# Patient Record
Sex: MALE | Race: WHITE | Employment: OTHER | URBAN - NONMETROPOLITAN AREA
[De-identification: names, ages, dates, MRNs, and addresses within clinical notes are randomized per-mention and may not be internally consistent; named-entity substitution may affect disease eponyms.]

---

## 2021-01-19 ENCOUNTER — TELEPHONE (OUTPATIENT)
Dept: CARDIOLOGY CLINIC | Age: 61
End: 2021-01-19

## 2021-03-02 ENCOUNTER — OFFICE VISIT (OUTPATIENT)
Dept: CARDIOLOGY CLINIC | Age: 61
End: 2021-03-02
Payer: MEDICARE

## 2021-03-02 VITALS
BODY MASS INDEX: 27.85 KG/M2 | HEART RATE: 72 BPM | DIASTOLIC BLOOD PRESSURE: 70 MMHG | HEIGHT: 75 IN | SYSTOLIC BLOOD PRESSURE: 138 MMHG | WEIGHT: 224 LBS

## 2021-03-02 DIAGNOSIS — R42 DIZZINESS: Primary | ICD-10-CM

## 2021-03-02 DIAGNOSIS — I49.1 PAC (PREMATURE ATRIAL CONTRACTION): ICD-10-CM

## 2021-03-02 DIAGNOSIS — E78.01 FAMILIAL HYPERCHOLESTEROLEMIA: ICD-10-CM

## 2021-03-02 DIAGNOSIS — I10 ESSENTIAL HYPERTENSION: ICD-10-CM

## 2021-03-02 DIAGNOSIS — R06.02 SOB (SHORTNESS OF BREATH): ICD-10-CM

## 2021-03-02 PROCEDURE — 93000 ELECTROCARDIOGRAM COMPLETE: CPT | Performed by: NUCLEAR MEDICINE

## 2021-03-02 PROCEDURE — 99204 OFFICE O/P NEW MOD 45 MIN: CPT | Performed by: NUCLEAR MEDICINE

## 2021-03-02 RX ORDER — OXCARBAZEPINE 150 MG/1
150 TABLET, FILM COATED ORAL 2 TIMES DAILY
COMMUNITY

## 2021-03-02 RX ORDER — GLIPIZIDE 5 MG/1
5 TABLET ORAL
COMMUNITY
End: 2022-04-18 | Stop reason: ALTCHOICE

## 2021-03-02 RX ORDER — PRAVASTATIN SODIUM 40 MG
40 TABLET ORAL EVERY OTHER DAY
COMMUNITY

## 2021-03-02 RX ORDER — LOSARTAN POTASSIUM 100 MG/1
100 TABLET ORAL DAILY
COMMUNITY

## 2021-03-02 RX ORDER — ASPIRIN 81 MG/1
81 TABLET ORAL DAILY
COMMUNITY

## 2021-03-02 RX ORDER — LEVETIRACETAM 750 MG/1
750 TABLET ORAL DAILY
COMMUNITY

## 2021-03-02 ASSESSMENT — ENCOUNTER SYMPTOMS
ABDOMINAL DISTENTION: 0
DIARRHEA: 0
SHORTNESS OF BREATH: 1
ABDOMINAL PAIN: 0
VOMITING: 0
PHOTOPHOBIA: 0
ANAL BLEEDING: 0
CONSTIPATION: 0
CHEST TIGHTNESS: 0
COLOR CHANGE: 0
BACK PAIN: 0
BLOOD IN STOOL: 0
NAUSEA: 0
RECTAL PAIN: 0

## 2021-03-02 NOTE — PROGRESS NOTES
[address] [address] [address] [facility],4Th Floor [address] [facility]  Dept: [phone]  Dept Fax: [phone]  Loc: [phone]    Visit Date: 3/2/2021    Guillermina Soulier is a 59 y.o. male who presents todayfor:  Chief Complaint   Patient presents with    New Patient    Irregular Heart Beat    Hypertension    Diabetes    Hyperlipidemia   here for the first time  He is not sure why he is here !!! Looks like he had a holter  Some PACs and short SVT  He doesn't feel it much   Does have Dm for several years  No chest pain   No changes in breathing  Does have baseline dyspnea   Does have seizure disorder  Does have HTn   Seems under control  On statins for hyperlipidemia  Does smoke  Does drink a lot of caffeine   Pot a day   Does have some dyspnea on exertion   Family history of CAD       HPI:  HPI  Past Medical History:   Diagnosis Date    Diabetes mellitus ([facility] [address]     Seizures ([facility] [address]       No past surgical history on file. No family history on file.   Social History     Tobacco Use    Smoking status: Current Every Day Smoker     Packs/day: 1.00     Types: Cigarettes   Substance Use Topics    Alcohol use: No      Current Outpatient Medications   Medication Sig Dispense Refill    levETIRAcetam (KEPPRA) 750 MG tablet Take 750 mg by mouth 2 times daily 2 in am 2 in pm      OXcarbazepine (TRILEPTAL) 150 MG tablet Take 150 mg by mouth 3 times daily      glipiZIDE (GLUCOTROL) 5 MG tablet Take 5 mg by mouth 2 times daily (before meals)      losartan (COZAAR) 100 MG tablet Take 100 mg by mouth daily      pravastatin (PRAVACHOL) 40 MG tablet Take 40 mg by mouth every other day      aspirin 81 MG EC tablet Take 81 mg by mouth daily      divalproex (DEPAKOTE ER) 500 MG extended release tablet Take 1,000 mg by mouth every 12 hours      lamoTRIgine (LAMICTAL) 150 MG tablet Take 300 mg by mouth 2 times daily  metFORMIN (GLUCOPHAGE) 1000 MG tablet Take 1,000 mg by mouth 2 times daily (with meals)       No current facility-administered medications for this visit. No Known Allergies  Health Maintenance   Topic Date Due    Hepatitis C screen  Never done    Pneumococcal 0-64 years Vaccine (1 of 1 - PPSV23) Never done    Lipid screen  Never done    HIV screen  Never done    DTaP/Tdap/Td vaccine (1 - Tdap) Never done    Diabetes screen  Never done    Shingles Vaccine (1 of 2) Never done    Colon cancer screen colonoscopy  Never done    Potassium monitoring  11/23/2017    Creatinine monitoring  11/23/2017    Flu vaccine (1) Never done    Hepatitis A vaccine  Aged Out    Hepatitis B vaccine  Aged Out    Hib vaccine  Aged Out    Meningococcal (ACWY) vaccine  Aged Out       Subjective:  Review of Systems   Constitutional: Positive for fatigue. HENT: Negative for ear pain and nosebleeds. Eyes: Negative for photophobia. Respiratory: Positive for shortness of breath. Negative for chest tightness. Cardiovascular: Negative for chest pain and palpitations. Gastrointestinal: Negative for abdominal distention, abdominal pain, anal bleeding, blood in stool, constipation, diarrhea, nausea, rectal pain and vomiting. Endocrine: Negative for polyphagia. Genitourinary: Negative for dysuria, hematuria and urgency. Musculoskeletal: Negative for arthralgias, back pain, gait problem, joint swelling, myalgias, neck pain and neck stiffness. Skin: Negative for color change, pallor, rash and wound. Allergic/Immunologic: Negative for food allergies. Neurological: Positive for seizures. Negative for dizziness and light-headedness. Psychiatric/Behavioral: Negative for confusion, decreased concentration, dysphoric mood, hallucinations and self-injury. The patient is not nervous/anxious and is not hyperactive. Objective:  Physical Exam  HENT:      Head: Normocephalic. Right Ear: Tympanic membrane normal.      Nose: Nose normal.      Mouth/Throat:      Mouth: Mucous membranes are moist.   Eyes:      Pupils: Pupils are equal, round, and reactive to light. Neck:      Musculoskeletal: Normal range of motion. Cardiovascular:      Rate and Rhythm: Normal rate and regular rhythm. Pulses: Normal pulses. Heart sounds: Murmur present. No gallop. Pulmonary:      Effort: No respiratory distress. Breath sounds: No stridor. No wheezing, rhonchi or rales. Chest:      Chest wall: No tenderness. Abdominal:      General: There is no distension. Palpations: There is no mass. Tenderness: There is no abdominal tenderness. There is no right CVA tenderness, left CVA tenderness, guarding or rebound. Hernia: No hernia is present. Musculoskeletal:         General: No swelling, tenderness, deformity or signs of injury. Right lower leg: No edema. Left lower leg: No edema. Skin:     Coloration: Skin is not jaundiced or pale. Findings: No bruising, erythema, lesion or rash. Neurological:      Mental Status: He is alert and oriented to person, place, and time. Cranial Nerves: No cranial nerve deficit. Sensory: No sensory deficit. Motor: No weakness. Coordination: Coordination normal.      Gait: Gait normal.      Deep Tendon Reflexes: Reflexes normal.   Psychiatric:         Mood and Affect: Mood normal.         Thought Content: Thought content normal.       /70   Pulse 72   Ht 6' 3\" (1.905 m)   Wt 224 lb (101.6 kg)   BMI 28.00 kg/m²     Assessment:      Diagnosis Orders   1. Dizziness  EKG 12 lead   2. PAC (premature atrial contraction)     3. Essential hypertension     4. Familial hypercholesterolemia     does have risk for CAD  Smoking: discussed with the patient the importance of smoke cessation especially with the risk of CAD.   Abnormal ECG   Some SVT and PACs and PVCs  Risk for CAD     Plan: No follow-ups on file. Discussed  Needs to cut back on caffeine   Consider an echo and stress test   Consider a low dose beta blockers   Continue risk factor modification and medical management    Thank you for allowing me to participate in the care of your patient. Please don't hesitate to contact me regarding any further issues related to the patient care    Orders Placed:  Orders Placed This Encounter   Procedures    EKG 12 lead     Order Specific Question:   Reason for Exam?     Answer: Other       Medications Prescribed:  No orders of the defined types were placed in this encounter. Discussed use, benefit, and side effects of prescribed medications. All patient questions answered. Pt voicedunderstanding. Instructed to continue current medications, diet and exercise. Continue risk factor modification and medical management. Patient agreed with treatment plan. Follow up as directed.     Electronically signedby Cyrus Parks MD on 3/2/2021 at 12:58 PM

## 2021-03-24 DIAGNOSIS — I49.1 PAC (PREMATURE ATRIAL CONTRACTION): ICD-10-CM

## 2021-03-24 DIAGNOSIS — E78.01 FAMILIAL HYPERCHOLESTEROLEMIA: ICD-10-CM

## 2021-03-24 DIAGNOSIS — R06.02 SOB (SHORTNESS OF BREATH): ICD-10-CM

## 2021-03-24 DIAGNOSIS — R42 DIZZINESS: ICD-10-CM

## 2021-03-24 DIAGNOSIS — I10 ESSENTIAL HYPERTENSION: ICD-10-CM

## 2021-05-27 ENCOUNTER — TELEPHONE (OUTPATIENT)
Dept: CARDIOLOGY CLINIC | Age: 61
End: 2021-05-27

## 2021-05-27 NOTE — TELEPHONE ENCOUNTER
[facility] at [facility] kirstin Hendrickson states pt is having dizziness spells prior to him having seizures and she did order a holter monitor on him.  Wanted you to be aware

## 2022-04-18 ENCOUNTER — OFFICE VISIT (OUTPATIENT)
Dept: CARDIOLOGY CLINIC | Age: 62
End: 2022-04-18
Payer: MEDICARE

## 2022-04-18 VITALS
WEIGHT: 217 LBS | HEART RATE: 69 BPM | BODY MASS INDEX: 26.98 KG/M2 | HEIGHT: 75 IN | DIASTOLIC BLOOD PRESSURE: 92 MMHG | SYSTOLIC BLOOD PRESSURE: 172 MMHG

## 2022-04-18 DIAGNOSIS — E78.01 FAMILIAL HYPERCHOLESTEROLEMIA: ICD-10-CM

## 2022-04-18 DIAGNOSIS — I10 ESSENTIAL HYPERTENSION: ICD-10-CM

## 2022-04-18 DIAGNOSIS — R42 DIZZINESS: Primary | ICD-10-CM

## 2022-04-18 PROCEDURE — G8419 CALC BMI OUT NRM PARAM NOF/U: HCPCS | Performed by: NUCLEAR MEDICINE

## 2022-04-18 PROCEDURE — 99214 OFFICE O/P EST MOD 30 MIN: CPT | Performed by: NUCLEAR MEDICINE

## 2022-04-18 PROCEDURE — 93000 ELECTROCARDIOGRAM COMPLETE: CPT | Performed by: NUCLEAR MEDICINE

## 2022-04-18 PROCEDURE — 3017F COLORECTAL CA SCREEN DOC REV: CPT | Performed by: NUCLEAR MEDICINE

## 2022-04-18 PROCEDURE — G8427 DOCREV CUR MEDS BY ELIG CLIN: HCPCS | Performed by: NUCLEAR MEDICINE

## 2022-04-18 PROCEDURE — 4004F PT TOBACCO SCREEN RCVD TLK: CPT | Performed by: NUCLEAR MEDICINE

## 2022-04-18 RX ORDER — BRIVARACETAM 100 MG/1
TABLET, FILM COATED ORAL
COMMUNITY

## 2022-04-18 NOTE — PROGRESS NOTES
[address]  [address]  [facility] [address]  Dept: [phone]  Dept Fax: [phone]  Loc: [phone]    Visit Date: 4/18/2022    Jose Chowdhury is a 57 y.o. male who presents todayfor:  Chief Complaint   Patient presents with    Follow-up     dizziness ref by DR Francine Sorto Loss of Consciousness    Irregular Heart Beat   had syncope  Doesn't recall the details  Was out shortly   No chest pain   No changes in breathing  Know PAcs  And PVcs  Had monitor before  Still smoking   Does have seizure disorder   Does have HTN   Seems higher today   On statins for hyperlipidemia  Referred back by neurology         HPI:  HPI  Past Medical History:   Diagnosis Date    Diabetes mellitus ([facility] [address]     Seizures ([facility] [address]       No past surgical history on file. No family history on file. Social History     Tobacco Use    Smoking status: Current Every Day Smoker     Packs/day: 1.00     Types: Cigarettes    Smokeless tobacco: Not on file   Substance Use Topics    Alcohol use: No      Current Outpatient Medications   Medication Sig Dispense Refill    Brivaracetam (BRIVIACT) 100 MG TABS Take by mouth.  levETIRAcetam (KEPPRA) 750 MG tablet Take 750 mg by mouth daily 2 in am 2 in pm       OXcarbazepine (TRILEPTAL) 150 MG tablet Take 150 mg by mouth 2 times daily       losartan (COZAAR) 100 MG tablet Take 100 mg by mouth daily      pravastatin (PRAVACHOL) 40 MG tablet Take 40 mg by mouth every other day      aspirin 81 MG EC tablet Take 81 mg by mouth daily      divalproex (DEPAKOTE ER) 500 MG extended release tablet Take 1,000 mg by mouth every 12 hours      lamoTRIgine (LAMICTAL) 150 MG tablet Take 300 mg by mouth 2 times daily      metFORMIN (GLUCOPHAGE) 1000 MG tablet Take 1,000 mg by mouth 2 times daily (with meals)       No current facility-administered medications for this visit.      No Known Allergies  Health Maintenance   Topic Date Due    Hepatitis C screen  Never done    COVID-19 Vaccine (1) Never done    Pneumococcal 0-64 years Vaccine (1 - PCV) Never done    Lipid screen  Never done    Depression Screen  Never done    HIV screen  Never done    DTaP/Tdap/Td vaccine (1 - Tdap) Never done    Diabetes screen  Never done    Colorectal Cancer Screen  Never done    Shingles Vaccine (1 of 2) Never done    Potassium monitoring  11/23/2017    Creatinine monitoring  11/23/2017    Annual Wellness Visit (AWV)  Never done    Flu vaccine (Season Ended) 09/01/2022    Hepatitis A vaccine  Aged Out    Hepatitis B vaccine  Aged Out    Hib vaccine  Aged Out    Meningococcal (ACWY) vaccine  Aged Out       Subjective:  Review of Systems  General:   No fever, no chills, some fatigue or weight loss  Pulmonary:    some dyspnea, no wheezing  Cardiac:    Denies recent chest pain,   GI:     No nausea or vomiting, no abdominal pain  Neuro:    some dizziness or light headedness,   Musculoskeletal:  No recent active issues  Extremities:   No edema, no obvious claudication       Objective:  Physical Exam  BP (!) 172/92   Pulse 69   Ht 6' 3\" (1.905 m)   Wt 217 lb (98.4 kg)   BMI 27.12 kg/m²   General:   Well developed, well nourished  Lungs:   Clear to auscultation  Heart:    Normal S1 S2, Slight murmur. no rubs, no gallops  Abdomen:   Soft, non tender, no organomegalies, positive bowel sounds  Extremities:   No edema, no cyanosis, good peripheral pulses  Neurological:   Awake, alert, oriented. No obvious focal deficits  Musculoskelatal:  No obvious deformities    Assessment:      Diagnosis Orders   1. Dizziness  EKG 12 Lead   2. Essential hypertension  EKG 12 Lead   3. Familial hypercholesterolemia     as above  Syncope  Neurological vs cardiac  Referred to me back by neurology       Plan:  No follow-ups on file.   Discussed  Event a and tilt table test   Continue risk factor modification and medical management  Thank you for allowing me to participate in the care of your patient. Please don't hesitate to contact me regarding any further issues related to the patient care    Orders Placed:  Orders Placed This Encounter   Procedures    EKG 12 Lead     Order Specific Question:   Reason for Exam?     Answer: Other       Medications Prescribed:  No orders of the defined types were placed in this encounter. Discussed use, benefit, and side effects of prescribed medications. All patient questions answered. Pt voicedunderstanding. Instructed to continue current medications, diet and exercise. Continue risk factor modification and medical management. Patient agreed with treatment plan. Follow up as directed.     Electronically signedby Arminda Astudillo MD on 4/18/2022 at 12:30 PM

## 2022-05-16 ENCOUNTER — HOSPITAL ENCOUNTER (OUTPATIENT)
Dept: NON INVASIVE DIAGNOSTICS | Age: 62
Discharge: HOME | End: 2022-05-16
Payer: MEDICARE

## 2022-05-16 DIAGNOSIS — R42 DIZZINESS: ICD-10-CM

## 2022-05-16 DIAGNOSIS — E78.01 FAMILIAL HYPERCHOLESTEROLEMIA: ICD-10-CM

## 2022-05-16 DIAGNOSIS — I10 ESSENTIAL HYPERTENSION: ICD-10-CM

## 2022-05-16 PROCEDURE — 93270 REMOTE 30 DAY ECG REV/REPORT: CPT

## 2022-05-16 PROCEDURE — 93660 TILT TABLE EVALUATION: CPT | Performed by: NUCLEAR MEDICINE

## 2022-05-16 NOTE — PROCEDURES
30 Day Cardiac Event Monitor was applied to patient. Instructions were given and skin/monitor prep and application was demonstrated. Patient was instructed to remove monitor on 06/15/2022 and mail back to [employer].

## 2022-05-17 NOTE — PROCEDURES
[address] [place] [address]                                TILT TABLE TEST    PATIENT NAME: Hi POP                  :        1960  MED REC NO:   [id number]                           ROOM:  ACCOUNT NO:   [de-identified]                           ADMIT DATE: 2022  PROVID[facility]     [facility]  [facility]    CLINICAL HISTORY AND INDICATION:  This is a patient with dizziness. TILT TABLE TEST DESCRIPTION AND FINDINGS:  Baseline EKG showed sinus  rhythm. Baseline blood pressure was 180/80, baseline heart rate was 62  beats per minute. The patient was tilted for a total of 30 minutes at  70-degree angle. Tilt was associated with an initial drop with  orthostatic hypotension dropping from 688 to 255 systolic; however, the  rest of the test stayed steady and under 983 to 279 systolic with a  steady heart rate, completed successfully without any complication. CONCLUSION:  Tilt table test associated with initial orthostatic  hypotension; otherwise, the rest of the test was unremarkable. RECOMMENDATIONS:  At this point, results were discussed with the patient  and further management will follow accordingly.         Madelyn Brand M.D.    D: 2022 14:48:12       T: 2022 15:11:40     [email or web address]  Job#: [id number]     Doc#: [id number]    CC:

## 2022-06-18 NOTE — PROCEDURES
[address] [place] [address]                                 EVENT MONITOR    PATIENT NAME: Alaina POP                  :        1960  MED REC NO:   [id number]                           ROOM:  ACCOUNT NO:   [de-identified]                           ADMIT DATE: 2022  PROVIDER:     Ashley Haider M.D.      TEST TYPE:  EVENT MONITOR. CLINICAL HISTORY AND INDICATION:  This is a patient with dizziness,  lightheadedness. EVENT MONITOR DESCRIPTION:  Event monitor was attached to the patient  between 2022 and 2022. EVENT MONITOR FINDINGS:  Baseline rhythm showed sinus rhythm. Unremarkable event monitor for any significant arrhythmias. CONCLUSIONS:  Unremarkable event monitor with no significant  arrhythmias.         Sean Sands M.D.    D: 2022 11:02:49       T: 2022 11:37:20     [email or web address]  Job#: [id number]     Doc#: [id number]    CC:

## 2023-01-23 ENCOUNTER — TELEPHONE (OUTPATIENT)
Dept: CARDIOLOGY CLINIC | Age: 63
End: 2023-01-23

## 2023-01-23 NOTE — TELEPHONE ENCOUNTER
Received referral for dizziness from the Neurology center. Called patient and scheduled him in may but talked to [place] and she said to bring him in sooner. I called patient back and asked him if he could come February 7th at 9 am in [place] but patient has to find a ride. He then told me he does not know why he is being referred to us again because Neurology center called him this morning and told him his heart monitor came back and everything was okay. I called neurology center but did not get a hold of anyone so I had to leave a message and ask them if the referral was supposed to come to us or was it a mistake. When they call back or we get any info please call patient and update him.

## 2023-01-25 NOTE — TELEPHONE ENCOUNTER
Spoke to patient. Appointment scheduled for 2-7-23 at 10:45 am [facility] office. Patient missed nov appointment with Dr. Saravia Friday.

## 2023-02-07 ENCOUNTER — OFFICE VISIT (OUTPATIENT)
Dept: CARDIOLOGY CLINIC | Age: 63
End: 2023-02-07
Payer: MEDICARE

## 2023-02-07 VITALS
DIASTOLIC BLOOD PRESSURE: 73 MMHG | HEART RATE: 58 BPM | WEIGHT: 209 LBS | SYSTOLIC BLOOD PRESSURE: 162 MMHG | HEIGHT: 75 IN | BODY MASS INDEX: 25.99 KG/M2

## 2023-02-07 DIAGNOSIS — E78.01 FAMILIAL HYPERCHOLESTEROLEMIA: ICD-10-CM

## 2023-02-07 DIAGNOSIS — I10 PRIMARY HYPERTENSION: Primary | ICD-10-CM

## 2023-02-07 PROCEDURE — 3077F SYST BP >= 140 MM HG: CPT | Performed by: NUCLEAR MEDICINE

## 2023-02-07 PROCEDURE — G8419 CALC BMI OUT NRM PARAM NOF/U: HCPCS | Performed by: NUCLEAR MEDICINE

## 2023-02-07 PROCEDURE — G8427 DOCREV CUR MEDS BY ELIG CLIN: HCPCS | Performed by: NUCLEAR MEDICINE

## 2023-02-07 PROCEDURE — 1123F ACP DISCUSS/DSCN MKR DOCD: CPT | Performed by: NUCLEAR MEDICINE

## 2023-02-07 PROCEDURE — G8484 FLU IMMUNIZE NO ADMIN: HCPCS | Performed by: NUCLEAR MEDICINE

## 2023-02-07 PROCEDURE — 3078F DIAST BP <80 MM HG: CPT | Performed by: NUCLEAR MEDICINE

## 2023-02-07 PROCEDURE — 99213 OFFICE O/P EST LOW 20 MIN: CPT | Performed by: NUCLEAR MEDICINE

## 2023-02-07 PROCEDURE — 3017F COLORECTAL CA SCREEN DOC REV: CPT | Performed by: NUCLEAR MEDICINE

## 2023-02-07 PROCEDURE — 4004F PT TOBACCO SCREEN RCVD TLK: CPT | Performed by: NUCLEAR MEDICINE

## 2023-02-07 RX ORDER — AMLODIPINE BESYLATE 5 MG/1
TABLET ORAL
COMMUNITY
Start: 2022-12-16

## 2023-02-07 NOTE — PROGRESS NOTES
[address] [address] [address] [facility],4[address] [address] [facility]  Dept: [phone]  Dept Fax: [phone]  Loc: [phone]    Visit Date: 2/7/2023    Claudean Jointer is a 70 y.o. male who presents todayfor:  Chief Complaint   Patient presents with    Follow-up     Dizziness     Hypertension    Hyperlipidemia   Seen for syncope  Cardiac work up was okay   Known seizure disorder since age 3  No chest pain  Some baseline dyspnea  Still smoking   BP is stable   No dizziness  No syncope        HPI:  HPI  Past Medical History:   Diagnosis Date    Diabetes mellitus ([address]     Seizures ([facility] [address]       No past surgical history on file. No family history on file. Social History     Tobacco Use    Smoking status: Every Day     Packs/day: 1.00     Types: Cigarettes    Smokeless tobacco: Not on file   Substance Use Topics    Alcohol use: No      Current Outpatient Medications   Medication Sig Dispense Refill    perampanel (FYCOMPA) 4 MG TABS tablet Take 4 mg by mouth nightly. Bid at night      amLODIPine (NORVASC) 5 MG tablet       Brivaracetam (BRIVIACT) 100 MG TABS Take 100 mg by mouth 2 times daily. levETIRAcetam (KEPPRA) 750 MG tablet Take 750 mg by mouth daily 2 in am 2 in pm       OXcarbazepine (TRILEPTAL) 150 MG tablet Take 150 mg by mouth 2 times daily       losartan (COZAAR) 100 MG tablet Take 100 mg by mouth daily      pravastatin (PRAVACHOL) 40 MG tablet Take 40 mg by mouth every other day      aspirin 81 MG EC tablet Take 81 mg by mouth daily      divalproex (DEPAKOTE ER) 500 MG extended release tablet Take 1,000 mg by mouth every 12 hours      lamoTRIgine (LAMICTAL) 150 MG tablet Take 300 mg by mouth 2 times daily      metFORMIN (GLUCOPHAGE) 1000 MG tablet Take 1,000 mg by mouth 2 times daily (with meals)       No current facility-administered medications for this visit.      No Known Allergies  Health Maintenance   Topic Date Due    Pneumococcal 65+ years You can put her on with me for a 1:30 slot for 40 min sometime on a Thursday at the end of August or September. Thanks. Put transfer from Dr. Mark Urena in the comments. Vaccine (1 - PCV) Never done    Lipids  Never done    Depression Screen  Never done    HIV screen  Never done    Hepatitis C screen  Never done    Diabetes screen  Never done    Shingles vaccine (1 of 2) Never done    Annual Wellness Visit (AWV)  Never done    COVID-19 Vaccine (3 - Booster for Moderna series) 07/02/2021    AAA screen  02/03/2023    Colorectal Cancer Screen  08/25/2030    DTaP/Tdap/Td vaccine (2 - Td or Tdap) 07/27/2032    Flu vaccine  Completed    Hepatitis A vaccine  Aged Out    Hib vaccine  Aged Out    Meningococcal (ACWY) vaccine  Aged Out       Subjective:  Review of Systems  General:   No fever, no chills, No fatigue or weight loss  Pulmonary:    No dyspnea, no wheezing  Cardiac:    Denies recent chest pain,   GI:     No nausea or vomiting, no abdominal pain  Neuro:    No dizziness or light headedness,   Musculoskeletal:  No recent active issues  Extremities:   No edema, no obvious claudication     Objective:  Physical Exam  BP (!) 162/73   Pulse 58   Ht 6' 3\" (1.905 m)   Wt 209 lb (94.8 kg)   BMI 26.12 kg/m²   General:   Well developed, well nourished  Lungs:   Clear to auscultation  Heart:    Normal S1 S2, Slight murmur. no rubs, no gallops  Abdomen:   Soft, non tender, no organomegalies, positive bowel sounds  Extremities:   No edema, no cyanosis, good peripheral pulses  Neurological:   Awake, alert, oriented. No obvious focal deficits  Musculoskelatal:  No obvious deformities    Assessment:      Diagnosis Orders   1. Primary hypertension        2. Familial hypercholesterolemia        As above  Cardiac risk for CAD  No known CAD     Plan:  No follow-ups on file. As above  Continue risk factor modification and medical management  Thank you for allowing me to participate in the care of your patient. Please don't hesitate to contact me regarding any further issues related to the patient care    Orders Placed:  No orders of the defined types were placed in this encounter.       Medications Prescribed:  No orders of the defined types were placed in this encounter. Discussed use, benefit, and side effects of prescribed medications. All patient questions answered. Pt voicedunderstanding. Instructed to continue current medications, diet and exercise. Continue risk factor modification and medical management. Patient agreed with treatment plan. Follow up as directed.     Electronically signedby Stephani Sheppard MD on 2/7/2023 at 10:57 AM

## 2023-09-11 ENCOUNTER — TELEPHONE (OUTPATIENT)
Dept: CARDIOLOGY CLINIC | Age: 63
End: 2023-09-11

## 2023-09-19 ENCOUNTER — OFFICE VISIT (OUTPATIENT)
Dept: CARDIOLOGY CLINIC | Age: 63
End: 2023-09-19
Payer: MEDICARE

## 2023-09-19 VITALS
SYSTOLIC BLOOD PRESSURE: 150 MMHG | HEIGHT: 75 IN | DIASTOLIC BLOOD PRESSURE: 64 MMHG | WEIGHT: 212.2 LBS | BODY MASS INDEX: 26.38 KG/M2 | HEART RATE: 76 BPM

## 2023-09-19 DIAGNOSIS — I10 PRIMARY HYPERTENSION: Primary | ICD-10-CM

## 2023-09-19 DIAGNOSIS — R55 SYNCOPE AND COLLAPSE: ICD-10-CM

## 2023-09-19 DIAGNOSIS — F17.200 SMOKING: ICD-10-CM

## 2023-09-19 DIAGNOSIS — E78.01 FAMILIAL HYPERCHOLESTEROLEMIA: ICD-10-CM

## 2023-09-19 PROCEDURE — G8417 CALC BMI ABV UP PARAM F/U: HCPCS | Performed by: NUCLEAR MEDICINE

## 2023-09-19 PROCEDURE — 4004F PT TOBACCO SCREEN RCVD TLK: CPT | Performed by: NUCLEAR MEDICINE

## 2023-09-19 PROCEDURE — 99214 OFFICE O/P EST MOD 30 MIN: CPT | Performed by: NUCLEAR MEDICINE

## 2023-09-19 PROCEDURE — 3078F DIAST BP <80 MM HG: CPT | Performed by: NUCLEAR MEDICINE

## 2023-09-19 PROCEDURE — 3017F COLORECTAL CA SCREEN DOC REV: CPT | Performed by: NUCLEAR MEDICINE

## 2023-09-19 PROCEDURE — G8427 DOCREV CUR MEDS BY ELIG CLIN: HCPCS | Performed by: NUCLEAR MEDICINE

## 2023-09-19 PROCEDURE — 3077F SYST BP >= 140 MM HG: CPT | Performed by: NUCLEAR MEDICINE

## 2023-09-19 PROCEDURE — 1123F ACP DISCUSS/DSCN MKR DOCD: CPT | Performed by: NUCLEAR MEDICINE

## 2024-02-08 ENCOUNTER — HOSPITAL ENCOUNTER (OUTPATIENT)
Dept: INFUSION THERAPY | Age: 64
Discharge: HOME | End: 2024-02-08
Payer: MEDICARE

## 2024-02-08 ENCOUNTER — OFFICE VISIT (OUTPATIENT)
Dept: ONCOLOGY | Age: 64
End: 2024-02-08
Payer: MEDICARE

## 2024-02-08 VITALS
SYSTOLIC BLOOD PRESSURE: 138 MMHG | DIASTOLIC BLOOD PRESSURE: 70 MMHG | HEIGHT: 75 IN | HEART RATE: 75 BPM | TEMPERATURE: 97.9 F | WEIGHT: 222.6 LBS | OXYGEN SATURATION: 96 % | RESPIRATION RATE: 18 BRPM | BODY MASS INDEX: 27.68 KG/M2

## 2024-02-08 VITALS
DIASTOLIC BLOOD PRESSURE: 70 MMHG | HEART RATE: 75 BPM | OXYGEN SATURATION: 96 % | SYSTOLIC BLOOD PRESSURE: 138 MMHG | RESPIRATION RATE: 18 BRPM | TEMPERATURE: 97.9 F

## 2024-02-08 DIAGNOSIS — D47.2 MGUS (MONOCLONAL GAMMOPATHY OF UNKNOWN SIGNIFICANCE): Primary | ICD-10-CM

## 2024-02-08 PROCEDURE — G8427 DOCREV CUR MEDS BY ELIG CLIN: HCPCS | Performed by: NURSE PRACTITIONER

## 2024-02-08 PROCEDURE — 4004F PT TOBACCO SCREEN RCVD TLK: CPT | Performed by: NURSE PRACTITIONER

## 2024-02-08 PROCEDURE — 99203 OFFICE O/P NEW LOW 30 MIN: CPT | Performed by: NURSE PRACTITIONER

## 2024-02-08 PROCEDURE — 3017F COLORECTAL CA SCREEN DOC REV: CPT | Performed by: NURSE PRACTITIONER

## 2024-02-08 PROCEDURE — 1123F ACP DISCUSS/DSCN MKR DOCD: CPT | Performed by: NURSE PRACTITIONER

## 2024-02-08 PROCEDURE — 99211 OFF/OP EST MAY X REQ PHY/QHP: CPT

## 2024-02-08 PROCEDURE — G8484 FLU IMMUNIZE NO ADMIN: HCPCS | Performed by: NURSE PRACTITIONER

## 2024-02-08 PROCEDURE — G8417 CALC BMI ABV UP PARAM F/U: HCPCS | Performed by: NURSE PRACTITIONER

## 2024-02-08 RX ORDER — DIVALPROEX SODIUM 250 MG/1
250 TABLET, DELAYED RELEASE ORAL DAILY
COMMUNITY

## 2024-02-08 NOTE — PROGRESS NOTES
[facility] [place] [facility]  [facility]  [address]  [address]  [facility] [address]  Dept: [phone]  Loc: [phone]       Visit Date:2/8/2024     Aram Irving is a 64 y.o. male who presents today for:   Chief Complaint   Patient presents with    New Patient     MGUS        HPI:   Aram Irving is a 64 y.o. male referred to Hematology/Oncology clinic by Dr. Tyler for evaluation of MGUS.  The patient has a history of COPD, PAD with intermittent claudication, diabetes with peripheral neuropathy, hyperlipidemia and HTN.  He is a current smoker.    The patient follows with Dr. Tyler for seizures which started around age 6.    01/10/2024 vitamin B12, folate and TSH level within normal limits.  SPEP/DIGNA interpretation shows a normal SPEP pattern.  DIGNA shows a faint band in lambda which may be indicative of a specific immune response or early monoclonal protein.    The patient denies any fevers or drenching night sweats.  No headaches, dizziness or vision changes.  No chest pain, cough or SOB.  No abdominal pain or GI issues.  He complains of chronic joint pain and muscle weakness.           PMH, SH, and FH:  I reviewed the patient's medication and allergy lists. The PMH, SH, and FH were also reviewed as noted on the EMR.        Past Medical History:   Diagnosis Date    Diabetes mellitus (HCC)     Seizures (HCC)       History reviewed. No pertinent surgical history.   Family History   Problem Relation Age of Onset    Lung Cancer Mother     Diabetes Sister       Social History     Tobacco Use    Smoking status: Every Day     Current packs/day: 1.00     Types: Cigarettes    Smokeless tobacco: Not on file   Substance Use Topics    Alcohol use: No      Current Outpatient Medications   Medication Sig Dispense Refill    divalproex (DEPAKOTE) 250 MG DR tablet Take 1 tablet by mouth daily      perampanel (FYCOMPA) 4 MG TABS tablet Take 1 tablet by mouth nightly.

## 2024-02-19 ENCOUNTER — CLINICAL DOCUMENTATION (OUTPATIENT)
Dept: ONCOLOGY | Age: 64
End: 2024-02-19

## 2024-02-19 NOTE — PROGRESS NOTES
WBC 8.8, Hgb 15.6, HCT 46.9%, MCV 94.7, RDW 12.7% and platelet count 260,000.  Sed rate and CRP within normal limits.  GFR 89, BUN 18 and creatinine 0.9.  Uric acid level 3.2.  BMP and hepatic function panel within normal limits.  SPEP/DIGNA interpretation shows normal SPEP pattern with a faint band in IgG lambda, no monoclonal protein detected.  IgG level slightly low at 488.  IgA and IgM levels within normal limits.  Tees Toh free light chain slightly elevated at 20.22, lambda free light chain within normal limits at 17.85 and kappa/lambda free light chain ratio within normal limits at 1.13.      The patient will be scheduled for follow-up with repeat labs in approximately 3 months.

## 2024-10-18 DIAGNOSIS — N52.9 ERECTILE DYSFUNCTION, UNSPECIFIED ERECTILE DYSFUNCTION TYPE: Primary | ICD-10-CM

## 2024-10-18 RX ORDER — SILDENAFIL 100 MG/1
100 TABLET, FILM COATED ORAL DAILY PRN
Qty: 20 TABLET | Refills: 1 | Status: SHIPPED | OUTPATIENT
Start: 2024-10-18

## 2024-10-18 RX ORDER — SILDENAFIL 100 MG/1
100 TABLET, FILM COATED ORAL DAILY PRN
Qty: 20 TABLET | Refills: 1 | Status: SHIPPED | OUTPATIENT
Start: 2024-10-18 | End: 2024-10-18

## 2025-05-06 ENCOUNTER — TELEPHONE (OUTPATIENT)
Dept: CARDIOLOGY CLINIC | Age: 65
End: 2025-05-06

## 2025-05-06 NOTE — TELEPHONE ENCOUNTER
Correspondence and last office note (Attached to this encounter) was sent over from [facility] on 03/26/25. However, it was sent to the fax at the [place] outreach office where Dr. Wynn goes. Fax was not seen until today, due to other offices using this same outreach office and it was taken off of the fax machine and put on one of the counters. We also have not been at this outreach office for several weeks. Called [facility] and let them know that they keep sending stuff to our outreach office and to please send to our main office fax, which I provided to them. Scanned in information from  Neuro into chart and called pt and left voicemail. Let them know we received a fax from neuro and were calling to see if they would like to get an appt scheduled with us. Pt was last seen on 09/19/2023. Pt has cancelled the last 2 appts that were made, and stated they would call back to r/s when they cancelled the last one.    Fax stated pt has a recent syncopal episode.

## 2025-05-09 NOTE — TELEPHONE ENCOUNTER
LM on [facility] Neuro nurse line.  Making them aware we have made multiple attempts to reach patient and have been unsuccessful and also that patient has cancelled last two appts scheduled with our office.

## 2025-06-03 ENCOUNTER — OFFICE VISIT (OUTPATIENT)
Dept: CARDIOLOGY CLINIC | Age: 65
End: 2025-06-03
Payer: MEDICARE

## 2025-06-03 VITALS
DIASTOLIC BLOOD PRESSURE: 73 MMHG | HEIGHT: 75 IN | BODY MASS INDEX: 24.94 KG/M2 | WEIGHT: 200.6 LBS | SYSTOLIC BLOOD PRESSURE: 128 MMHG | HEART RATE: 65 BPM

## 2025-06-03 DIAGNOSIS — E78.00 PURE HYPERCHOLESTEROLEMIA: ICD-10-CM

## 2025-06-03 DIAGNOSIS — R06.09 DOE (DYSPNEA ON EXERTION): ICD-10-CM

## 2025-06-03 DIAGNOSIS — Z72.0 TOBACCO ABUSE: ICD-10-CM

## 2025-06-03 DIAGNOSIS — I10 PRIMARY HYPERTENSION: ICD-10-CM

## 2025-06-03 DIAGNOSIS — Z87.898 HISTORY OF SYNCOPE: Primary | ICD-10-CM

## 2025-06-03 PROCEDURE — 93000 ELECTROCARDIOGRAM COMPLETE: CPT | Performed by: INTERNAL MEDICINE

## 2025-06-03 PROCEDURE — 3074F SYST BP LT 130 MM HG: CPT | Performed by: INTERNAL MEDICINE

## 2025-06-03 PROCEDURE — G8427 DOCREV CUR MEDS BY ELIG CLIN: HCPCS | Performed by: INTERNAL MEDICINE

## 2025-06-03 PROCEDURE — 1160F RVW MEDS BY RX/DR IN RCRD: CPT | Performed by: INTERNAL MEDICINE

## 2025-06-03 PROCEDURE — 99214 OFFICE O/P EST MOD 30 MIN: CPT | Performed by: INTERNAL MEDICINE

## 2025-06-03 PROCEDURE — 3078F DIAST BP <80 MM HG: CPT | Performed by: INTERNAL MEDICINE

## 2025-06-03 PROCEDURE — 1159F MED LIST DOCD IN RCRD: CPT | Performed by: INTERNAL MEDICINE

## 2025-06-03 PROCEDURE — 1123F ACP DISCUSS/DSCN MKR DOCD: CPT | Performed by: INTERNAL MEDICINE

## 2025-06-03 PROCEDURE — 4004F PT TOBACCO SCREEN RCVD TLK: CPT | Performed by: INTERNAL MEDICINE

## 2025-06-03 PROCEDURE — 3017F COLORECTAL CA SCREEN DOC REV: CPT | Performed by: INTERNAL MEDICINE

## 2025-06-03 PROCEDURE — G8419 CALC BMI OUT NRM PARAM NOF/U: HCPCS | Performed by: INTERNAL MEDICINE

## 2025-06-03 RX ORDER — CENOBAMATE 50MG-100MG
200 KIT ORAL
COMMUNITY
Start: 2025-02-06

## 2025-06-03 RX ORDER — CILOSTAZOL 50 MG/1
50 TABLET ORAL 2 TIMES DAILY
COMMUNITY
Start: 2024-01-09 | End: 2025-06-15

## 2025-06-03 RX ORDER — ZONISAMIDE 100 MG/1
100 CAPSULE ORAL DAILY
COMMUNITY
Start: 2024-03-26 | End: 2026-05-21

## 2025-06-03 NOTE — PROGRESS NOTES
Chief Complaint   Patient presents with    New Patient    Hypertension     Saw Dr. Bailey 09/2023  New patient to me and here for evaluation of syncope      Pat had syncope 3/26/25  EKG done today.    Intermittent LOC with partial complex seizure   And usually does not seek medical advise right away and he did same 03/2025  No foaming and bite of tough  Had nerve stimulator for seizure done at [facility]    No dizziness on activity    Denies cp, sob, palpitations, dizziness, lightheaded and KURT.    Smokes 1/2 ppd    FHX  None for CAD    PMHX  HTN  HLP  Hx of syncope  Seizure d/o on meds partial compelex  Hx of DM II    History reviewed. No pertinent surgical history.    No Known Allergies     Family History   Problem Relation Age of Onset    Lung Cancer Mother     Diabetes Sister         Social History     Socioeconomic History    Marital status:      Spouse name: Not on file    Number of children: Not on file    Years of education: Not on file    Highest education level: Not on file   Occupational History    Not on file   Tobacco Use    Smoking status: Every Day     Current packs/day: 1.00     Types: Cigarettes    Smokeless tobacco: Not on file   Substance and Sexual Activity    Alcohol use: No    Drug use: No    Sexual activity: Not Currently   Other Topics Concern    Not on file   Social History Narrative    Not on file     Social Drivers of Health     Financial Resource Strain: Not on file   Food Insecurity: Not on file   Transportation Needs: Not on file   Physical Activity: Not on file   Stress: Not on file   Social Connections: Not on file   Intimate Partner Violence: Not on file   Housing Stability: Not on file       Current Outpatient Medications   Medication Sig Dispense Refill    zonisamide (ZONEGRAN) 100 MG capsule Take 1 capsule by mouth daily      Cenobamate (XCOPRI) 14 x 50 MG & 14 x100 MG TBPK Take 200 mg by mouth      cilostazol (PLETAL) 50 MG tablet Take 1 tablet by mouth 2 times daily

## 2025-06-04 ENCOUNTER — TELEPHONE (OUTPATIENT)
Dept: CARDIOLOGY CLINIC | Age: 65
End: 2025-06-04

## 2025-06-27 ENCOUNTER — HOSPITAL ENCOUNTER (OUTPATIENT)
Age: 65
Discharge: HOME | End: 2025-06-29
Attending: INTERNAL MEDICINE
Payer: MEDICARE

## 2025-06-27 VITALS
SYSTOLIC BLOOD PRESSURE: 128 MMHG | DIASTOLIC BLOOD PRESSURE: 73 MMHG | HEIGHT: 75 IN | WEIGHT: 200 LBS | BODY MASS INDEX: 24.87 KG/M2

## 2025-06-27 DIAGNOSIS — I10 PRIMARY HYPERTENSION: ICD-10-CM

## 2025-06-27 DIAGNOSIS — Z72.0 TOBACCO ABUSE: ICD-10-CM

## 2025-06-27 DIAGNOSIS — R06.09 DOE (DYSPNEA ON EXERTION): ICD-10-CM

## 2025-06-27 DIAGNOSIS — Z87.898 HISTORY OF SYNCOPE: ICD-10-CM

## 2025-06-27 DIAGNOSIS — E78.00 PURE HYPERCHOLESTEROLEMIA: ICD-10-CM

## 2025-06-27 LAB
ECHO AV CUSP MM: 1.3 CM
ECHO AV PEAK GRADIENT: 7 MMHG
ECHO AV PEAK VELOCITY: 1.3 M/S
ECHO AV VELOCITY RATIO: 0.77
ECHO BSA: 2.19 M2
ECHO BSA: 2.19 M2
ECHO EST RA PRESSURE: 3 MMHG
ECHO LA DIAMETER INDEX: 1.42 CM/M2
ECHO LA DIAMETER: 3.1 CM
ECHO LV E' LATERAL VELOCITY: 9 CM/S
ECHO LV E' SEPTAL VELOCITY: 6.7 CM/S
ECHO LV EF PHYSICIAN: 60 %
ECHO LV FRACTIONAL SHORTENING: 25 % (ref 28–44)
ECHO LV INTERNAL DIMENSION DIASTOLE INDEX: 2.33 CM/M2
ECHO LV INTERNAL DIMENSION DIASTOLIC: 5.1 CM (ref 4.2–5.9)
ECHO LV INTERNAL DIMENSION SYSTOLIC INDEX: 1.74 CM/M2
ECHO LV INTERNAL DIMENSION SYSTOLIC: 3.8 CM
ECHO LV ISOVOLUMETRIC RELAXATION TIME (IVRT): 32 MS
ECHO LV IVSD: 1 CM (ref 0.6–1)
ECHO LV MASS 2D: 188 G (ref 88–224)
ECHO LV MASS INDEX 2D: 85.9 G/M2 (ref 49–115)
ECHO LV POSTERIOR WALL DIASTOLIC: 1 CM (ref 0.6–1)
ECHO LV RELATIVE WALL THICKNESS RATIO: 0.39
ECHO LVOT PEAK GRADIENT: 4 MMHG
ECHO LVOT PEAK VELOCITY: 1 M/S
ECHO MV A VELOCITY: 0.85 M/S
ECHO MV E DECELERATION TIME (DT): 327 MS
ECHO MV E VELOCITY: 0.56 M/S
ECHO MV E/A RATIO: 0.66
ECHO MV E/E' LATERAL: 6.22
ECHO MV E/E' RATIO (AVERAGED): 7.29
ECHO MV E/E' SEPTAL: 8.36
ECHO MV REGURGITANT PEAK GRADIENT: 34 MMHG
ECHO MV REGURGITANT PEAK VELOCITY: 2.9 M/S
ECHO PV MAX VELOCITY: 0.5 M/S
ECHO PV PEAK GRADIENT: 1 MMHG
ECHO RIGHT VENTRICULAR SYSTOLIC PRESSURE (RVSP): 27 MMHG
ECHO RV INTERNAL DIMENSION: 2.6 CM
ECHO TV E WAVE: 0.4 M/S
ECHO TV REGURGITANT MAX VELOCITY: 2.46 M/S
ECHO TV REGURGITANT PEAK GRADIENT: 24 MMHG

## 2025-06-27 PROCEDURE — 93306 TTE W/DOPPLER COMPLETE: CPT

## 2025-06-27 PROCEDURE — 93306 TTE W/DOPPLER COMPLETE: CPT | Performed by: INTERNAL MEDICINE

## 2025-06-27 PROCEDURE — 93242 EXT ECG>48HR<7D RECORDING: CPT

## 2025-07-17 LAB — ECHO BSA: 2.19 M2
